# Patient Record
Sex: FEMALE | Race: WHITE | NOT HISPANIC OR LATINO | URBAN - METROPOLITAN AREA
[De-identification: names, ages, dates, MRNs, and addresses within clinical notes are randomized per-mention and may not be internally consistent; named-entity substitution may affect disease eponyms.]

---

## 2018-07-03 ENCOUNTER — OFFICE VISIT (OUTPATIENT)
Dept: URBAN - METROPOLITAN AREA CLINIC 18 | Facility: CLINIC | Age: 68
End: 2018-07-03
Payer: MEDICARE

## 2018-07-03 DIAGNOSIS — H25.13 AGE-RELATED NUCLEAR CATARACT, BILATERAL: ICD-10-CM

## 2018-07-03 DIAGNOSIS — H43.392 OTHER VITREOUS OPACITIES, LEFT EYE: ICD-10-CM

## 2018-07-03 DIAGNOSIS — H52.03 HYPERMETROPIA, BILATERAL: Primary | ICD-10-CM

## 2018-07-03 PROCEDURE — 99214 OFFICE O/P EST MOD 30 MIN: CPT | Performed by: OPTOMETRIST

## 2018-07-03 ASSESSMENT — INTRAOCULAR PRESSURE
OD: 13
OS: 13

## 2018-07-03 ASSESSMENT — KERATOMETRY
OS: 43.50
OD: 44.13

## 2018-07-03 ASSESSMENT — VISUAL ACUITY
OD: 20/20
OS: 20/20

## 2018-07-03 NOTE — IMPRESSION/PLAN
Impression: Other vitreous opacities, left eye: H43.392. Plan: Discussed diagnosis in detail with patient. Advised patient of condition. No treatment is required at this time. Will continue to observe condition and or symptoms.

## 2019-07-10 ENCOUNTER — OFFICE VISIT (OUTPATIENT)
Dept: URBAN - METROPOLITAN AREA CLINIC 18 | Facility: CLINIC | Age: 69
End: 2019-07-10
Payer: MEDICARE

## 2019-07-10 DIAGNOSIS — H43.812 VITREOUS DEGENERATION, LEFT EYE: ICD-10-CM

## 2019-07-10 DIAGNOSIS — H25.813 COMBINED FORMS OF AGE-RELATED CATARACT, BILATERAL: Primary | ICD-10-CM

## 2019-07-10 DIAGNOSIS — H35.371 PUCKERING OF MACULA, RIGHT EYE: ICD-10-CM

## 2019-07-10 PROCEDURE — 99214 OFFICE O/P EST MOD 30 MIN: CPT | Performed by: OPTOMETRIST

## 2019-07-10 ASSESSMENT — VISUAL ACUITY
OD: 20/20
OS: 20/20

## 2019-07-10 ASSESSMENT — KERATOMETRY
OS: 43.63
OD: 43.88

## 2019-07-10 ASSESSMENT — INTRAOCULAR PRESSURE
OD: 17
OS: 16

## 2019-07-10 NOTE — IMPRESSION/PLAN
Impression: Puckering of macula, right eye: H35.371. Plan: Discussed diagnosis in detail with patient. No treatment is required at this time. Will continue to observe condition and or symptoms.

## 2021-06-18 ENCOUNTER — OFFICE VISIT (OUTPATIENT)
Dept: URBAN - METROPOLITAN AREA CLINIC 18 | Facility: CLINIC | Age: 71
End: 2021-06-18
Payer: MEDICARE

## 2021-06-18 DIAGNOSIS — H52.4 PRESBYOPIA: ICD-10-CM

## 2021-06-18 DIAGNOSIS — H00.15: ICD-10-CM

## 2021-06-18 DIAGNOSIS — H35.373 PUCKERING OF MACULA, BILATERAL: ICD-10-CM

## 2021-06-18 PROCEDURE — 92014 COMPRE OPH EXAM EST PT 1/>: CPT | Performed by: STUDENT IN AN ORGANIZED HEALTH CARE EDUCATION/TRAINING PROGRAM

## 2021-06-18 ASSESSMENT — INTRAOCULAR PRESSURE
OD: 15
OS: 15

## 2021-06-18 NOTE — IMPRESSION/PLAN
Impression: Puckering of macula, bilateral: H35.373. Plan: Mild, no treatment at this time. Monitor annually. Contact clinic if any sudden changes to vision.

## 2021-06-18 NOTE — IMPRESSION/PLAN
Impression: Presbyopia: H52.4. Plan: Discussed findings. Pt reported increased clarity with today's MRx. New Rx finalized and given to patient.

## 2021-06-18 NOTE — IMPRESSION/PLAN
Impression: Chalazion of lt lower eyelid: H00.15. Plan: Pt reports longstanding, 3 months. Oncologist had Rx'd drops that took eliminated discharge/pain, but stye is still present (no increase in size). No malignant features noted today, but will refer to oculoplastics for eval/tx. Discussed contacting clinic sooner if experiencing any changes.

## 2021-06-18 NOTE — IMPRESSION/PLAN
Impression: Age-related nuclear cataract, bilateral: H25.13. Plan: Discussed findings. Cataract accounts for patient's complaints, but surgery not recommended at this time. Update glasses Rx. Continue to monitor with annual DFE.

## 2022-04-29 NOTE — IMPRESSION/PLAN
Impression: Hypermetropia, bilateral: H52.03. Plan: Discussed diagnosis with patient. New glasses Rx given today. Please see when patient is getting procedure done - she will need to do lovenox injections while she is off of coumadin

## 2022-07-26 ENCOUNTER — OFFICE VISIT (OUTPATIENT)
Dept: URBAN - METROPOLITAN AREA CLINIC 18 | Facility: CLINIC | Age: 72
End: 2022-07-26
Payer: MEDICARE

## 2022-07-26 DIAGNOSIS — H43.812 VITREOUS DEGENERATION, LEFT EYE: ICD-10-CM

## 2022-07-26 DIAGNOSIS — H35.373 PUCKERING OF MACULA, BILATERAL: ICD-10-CM

## 2022-07-26 DIAGNOSIS — H25.813 COMBINED FORMS OF AGE-RELATED CATARACT, BILATERAL: Primary | ICD-10-CM

## 2022-07-26 DIAGNOSIS — H52.4 PRESBYOPIA: ICD-10-CM

## 2022-07-26 PROCEDURE — 99213 OFFICE O/P EST LOW 20 MIN: CPT | Performed by: OPTOMETRIST

## 2022-07-26 ASSESSMENT — VISUAL ACUITY
OS: 20/20
OD: 20/20

## 2022-07-26 ASSESSMENT — INTRAOCULAR PRESSURE
OD: 13
OS: 12

## 2022-07-26 NOTE — IMPRESSION/PLAN
Impression: Presbyopia: H52.4. Plan: Finalized [employer] [place] [employer]. Patient education on appropriate options of eye glasses. Return to clinic in one year for complete eye exam and refraction.

## 2022-07-26 NOTE — IMPRESSION/PLAN
Impression: Puckering of macula, bilateral: H35.373. Plan: Discussed diagnosis in detail with patient. No treatment is required at this time. Will continue to observe condition and or symptoms. Patient instructed to call if condition gets worse.  Order Refraction per patient request.

## 2023-08-01 ENCOUNTER — OFFICE VISIT (OUTPATIENT)
Dept: URBAN - METROPOLITAN AREA CLINIC 18 | Facility: CLINIC | Age: 73
End: 2023-08-01
Payer: MEDICARE

## 2023-08-01 DIAGNOSIS — H25.813 COMBINED FORMS OF AGE-RELATED CATARACT, BILATERAL: Primary | ICD-10-CM

## 2023-08-01 DIAGNOSIS — H43.392 OTHER VITREOUS OPACITIES, LEFT EYE: ICD-10-CM

## 2023-08-01 DIAGNOSIS — H52.4 PRESBYOPIA: ICD-10-CM

## 2023-08-01 PROCEDURE — 99213 OFFICE O/P EST LOW 20 MIN: CPT | Performed by: OPTOMETRIST

## 2023-08-01 ASSESSMENT — INTRAOCULAR PRESSURE
OD: 14
OS: 13

## 2023-08-01 ASSESSMENT — VISUAL ACUITY
OS: 20/20
OD: 20/20

## 2024-07-12 ENCOUNTER — OFFICE VISIT (OUTPATIENT)
Dept: URBAN - METROPOLITAN AREA CLINIC 18 | Facility: CLINIC | Age: 74
End: 2024-07-12
Payer: MEDICARE

## 2024-07-12 DIAGNOSIS — H04.123 DRY EYE SYNDROME OF BILATERAL LACRIMAL GLANDS: ICD-10-CM

## 2024-07-12 DIAGNOSIS — H35.373 PUCKERING OF MACULA, BILATERAL: ICD-10-CM

## 2024-07-12 DIAGNOSIS — H25.813 COMBINED FORMS OF AGE-RELATED CATARACT, BILATERAL: Primary | ICD-10-CM

## 2024-07-12 DIAGNOSIS — H43.812 VITREOUS DEGENERATION, LEFT EYE: ICD-10-CM

## 2024-07-12 PROCEDURE — 99214 OFFICE O/P EST MOD 30 MIN: CPT

## 2024-07-12 ASSESSMENT — INTRAOCULAR PRESSURE
OS: 14
OD: 12

## 2025-07-22 ENCOUNTER — OFFICE VISIT (OUTPATIENT)
Dept: URBAN - METROPOLITAN AREA CLINIC 18 | Facility: CLINIC | Age: 75
End: 2025-07-22
Payer: MEDICARE

## 2025-07-22 DIAGNOSIS — H43.812 VITREOUS DEGENERATION, LEFT EYE: ICD-10-CM

## 2025-07-22 DIAGNOSIS — H04.123 DRY EYE SYNDROME OF BILATERAL LACRIMAL GLANDS: ICD-10-CM

## 2025-07-22 DIAGNOSIS — H25.13 AGE-RELATED NUCLEAR CATARACT, BILATERAL: Primary | ICD-10-CM

## 2025-07-22 DIAGNOSIS — H52.4 PRESBYOPIA: ICD-10-CM

## 2025-07-22 PROCEDURE — 92014 COMPRE OPH EXAM EST PT 1/>: CPT

## 2025-07-22 ASSESSMENT — VISUAL ACUITY
OS: 20/20
OD: 20/25

## 2025-07-22 ASSESSMENT — INTRAOCULAR PRESSURE
OS: 15
OD: 13

## 2025-07-22 ASSESSMENT — KERATOMETRY
OS: 43.88
OD: 43.75